# Patient Record
Sex: FEMALE | Race: WHITE | NOT HISPANIC OR LATINO
[De-identification: names, ages, dates, MRNs, and addresses within clinical notes are randomized per-mention and may not be internally consistent; named-entity substitution may affect disease eponyms.]

---

## 2017-02-03 ENCOUNTER — TRANSCRIPTION ENCOUNTER (OUTPATIENT)
Age: 42
End: 2017-02-03

## 2017-02-23 ENCOUNTER — TRANSCRIPTION ENCOUNTER (OUTPATIENT)
Age: 42
End: 2017-02-23

## 2017-02-28 ENCOUNTER — MESSAGE (OUTPATIENT)
Age: 42
End: 2017-02-28

## 2017-02-28 ENCOUNTER — RX RENEWAL (OUTPATIENT)
Age: 42
End: 2017-02-28

## 2017-03-09 RX ORDER — ALPRAZOLAM 0.25 MG/1
0.25 TABLET ORAL
Qty: 30 | Refills: 0 | Status: COMPLETED | COMMUNITY
Start: 2017-02-28 | End: 2017-02-28

## 2017-07-27 ENCOUNTER — RX RENEWAL (OUTPATIENT)
Age: 42
End: 2017-07-27

## 2017-09-06 ENCOUNTER — APPOINTMENT (OUTPATIENT)
Dept: OBGYN | Facility: CLINIC | Age: 42
End: 2017-09-06
Payer: COMMERCIAL

## 2017-09-06 DIAGNOSIS — Z01.419 ENCOUNTER FOR GYNECOLOGICAL EXAMINATION (GENERAL) (ROUTINE) W/OUT ABNORMAL FINDINGS: ICD-10-CM

## 2017-09-06 PROCEDURE — 82270 OCCULT BLOOD FECES: CPT

## 2017-09-06 PROCEDURE — 99396 PREV VISIT EST AGE 40-64: CPT

## 2017-09-06 PROCEDURE — 81003 URINALYSIS AUTO W/O SCOPE: CPT | Mod: QW

## 2017-09-07 ENCOUNTER — RESULT REVIEW (OUTPATIENT)
Age: 42
End: 2017-09-07

## 2017-09-11 ENCOUNTER — RESULT REVIEW (OUTPATIENT)
Age: 42
End: 2017-09-11

## 2017-09-11 LAB
CYTOLOGY CVX/VAG DOC THIN PREP: NORMAL
HPV HIGH+LOW RISK DNA PNL CVX: NEGATIVE

## 2017-12-26 ENCOUNTER — TRANSCRIPTION ENCOUNTER (OUTPATIENT)
Age: 42
End: 2017-12-26

## 2018-09-03 ENCOUNTER — FORM ENCOUNTER (OUTPATIENT)
Age: 43
End: 2018-09-03

## 2018-09-04 ENCOUNTER — APPOINTMENT (OUTPATIENT)
Dept: OBGYN | Facility: CLINIC | Age: 43
End: 2018-09-04
Payer: COMMERCIAL

## 2018-09-04 ENCOUNTER — OUTPATIENT (OUTPATIENT)
Dept: OUTPATIENT SERVICES | Facility: HOSPITAL | Age: 43
LOS: 1 days | End: 2018-09-04
Payer: COMMERCIAL

## 2018-09-04 ENCOUNTER — APPOINTMENT (OUTPATIENT)
Dept: MAMMOGRAPHY | Facility: CLINIC | Age: 43
End: 2018-09-04
Payer: COMMERCIAL

## 2018-09-04 VITALS
BODY MASS INDEX: 30.49 KG/M2 | TEMPERATURE: 98.4 F | WEIGHT: 213 LBS | DIASTOLIC BLOOD PRESSURE: 62 MMHG | HEIGHT: 70 IN | SYSTOLIC BLOOD PRESSURE: 110 MMHG

## 2018-09-04 DIAGNOSIS — Z00.8 ENCOUNTER FOR OTHER GENERAL EXAMINATION: ICD-10-CM

## 2018-09-04 LAB
BILIRUB UR QL STRIP: NORMAL
CLARITY UR: CLEAR
COLLECTION METHOD: NORMAL
GLUCOSE UR-MCNC: NORMAL
HCG UR QL: 0.2 EU/DL
HGB UR QL STRIP.AUTO: NORMAL
KETONES UR-MCNC: NORMAL
LEUKOCYTE ESTERASE UR QL STRIP: NORMAL
NITRITE UR QL STRIP: NORMAL
PH UR STRIP: 5.5
PROT UR STRIP-MCNC: NORMAL
SP GR UR STRIP: >=1.03

## 2018-09-04 PROCEDURE — 77067 SCR MAMMO BI INCL CAD: CPT

## 2018-09-04 PROCEDURE — 77067 SCR MAMMO BI INCL CAD: CPT | Mod: 26

## 2018-09-04 PROCEDURE — 82270 OCCULT BLOOD FECES: CPT

## 2018-09-04 PROCEDURE — 77063 BREAST TOMOSYNTHESIS BI: CPT | Mod: 26

## 2018-09-04 PROCEDURE — 99396 PREV VISIT EST AGE 40-64: CPT

## 2018-09-04 PROCEDURE — 77063 BREAST TOMOSYNTHESIS BI: CPT

## 2018-09-04 RX ORDER — ALPRAZOLAM 0.25 MG/1
0.25 TABLET ORAL
Qty: 20 | Refills: 0 | Status: ACTIVE | COMMUNITY
Start: 2018-09-04 | End: 1900-01-01

## 2018-09-10 LAB
CYTOLOGY CVX/VAG DOC THIN PREP: NORMAL
HPV HIGH+LOW RISK DNA PNL CVX: NOT DETECTED

## 2018-09-28 ENCOUNTER — TRANSCRIPTION ENCOUNTER (OUTPATIENT)
Age: 43
End: 2018-09-28

## 2018-10-10 ENCOUNTER — TRANSCRIPTION ENCOUNTER (OUTPATIENT)
Age: 43
End: 2018-10-10

## 2019-01-30 ENCOUNTER — TRANSCRIPTION ENCOUNTER (OUTPATIENT)
Age: 44
End: 2019-01-30

## 2019-03-20 ENCOUNTER — RX RENEWAL (OUTPATIENT)
Age: 44
End: 2019-03-20

## 2019-12-10 ENCOUNTER — FORM ENCOUNTER (OUTPATIENT)
Age: 44
End: 2019-12-10

## 2019-12-11 ENCOUNTER — APPOINTMENT (OUTPATIENT)
Dept: MAMMOGRAPHY | Facility: CLINIC | Age: 44
End: 2019-12-11
Payer: COMMERCIAL

## 2019-12-11 ENCOUNTER — APPOINTMENT (OUTPATIENT)
Dept: OBGYN | Facility: CLINIC | Age: 44
End: 2019-12-11
Payer: COMMERCIAL

## 2019-12-11 ENCOUNTER — OUTPATIENT (OUTPATIENT)
Dept: OUTPATIENT SERVICES | Facility: HOSPITAL | Age: 44
LOS: 1 days | End: 2019-12-11
Payer: COMMERCIAL

## 2019-12-11 VITALS
HEIGHT: 59 IN | WEIGHT: 200 LBS | DIASTOLIC BLOOD PRESSURE: 70 MMHG | RESPIRATION RATE: 20 BRPM | HEART RATE: 90 BPM | BODY MASS INDEX: 40.32 KG/M2 | SYSTOLIC BLOOD PRESSURE: 118 MMHG

## 2019-12-11 DIAGNOSIS — Z01.419 ENCOUNTER FOR GYNECOLOGICAL EXAMINATION (GENERAL) (ROUTINE) W/OUT ABNORMAL FINDINGS: ICD-10-CM

## 2019-12-11 DIAGNOSIS — Z00.8 ENCOUNTER FOR OTHER GENERAL EXAMINATION: ICD-10-CM

## 2019-12-11 PROCEDURE — 77063 BREAST TOMOSYNTHESIS BI: CPT | Mod: 26

## 2019-12-11 PROCEDURE — 77067 SCR MAMMO BI INCL CAD: CPT | Mod: 26

## 2019-12-11 PROCEDURE — 77067 SCR MAMMO BI INCL CAD: CPT

## 2019-12-11 PROCEDURE — 82270 OCCULT BLOOD FECES: CPT

## 2019-12-11 PROCEDURE — 77063 BREAST TOMOSYNTHESIS BI: CPT

## 2019-12-11 PROCEDURE — 99396 PREV VISIT EST AGE 40-64: CPT

## 2019-12-11 NOTE — PHYSICAL EXAM
[Acute Distress] : no acute distress [Alert] : alert [Awake] : awake [Mass] : no breast mass [Nipple Discharge] : no nipple discharge [Soft] : soft [Axillary LAD] : no axillary lymphadenopathy [Tender] : non tender [Oriented x3] : oriented to person, place, and time [Normal] : cervix [Uterine Adnexae] : were not tender and not enlarged [Pap Obtained] : a Pap smear was performed [No Bleeding] : there was no active vaginal bleeding [Occult Blood] : occult blood test from digital rectal exam was negative [Nl Sphincter Tone] : normal sphincter tone [No Tenderness] : no rectal tenderness

## 2019-12-11 NOTE — COUNSELING
[Breast Self Exam] : breast self exam [Vitamins/Supplements] : vitamins/supplements [Exercise] : exercise [Nutrition] : nutrition [Weight Management] : weight management

## 2019-12-11 NOTE — CHIEF COMPLAINT
[Annual Visit] : annual visit [FreeTextEntry1] : Patient is a 44-year-old female presents for routine annual gynecologic examination. Patient has no complaints. Patient's last mammogram September 2018. Discussed weight loss with the patient in detail

## 2019-12-12 LAB — HPV HIGH+LOW RISK DNA PNL CVX: NOT DETECTED

## 2020-01-02 ENCOUNTER — TRANSCRIPTION ENCOUNTER (OUTPATIENT)
Age: 45
End: 2020-01-02

## 2020-01-06 ENCOUNTER — TRANSCRIPTION ENCOUNTER (OUTPATIENT)
Age: 45
End: 2020-01-06

## 2020-07-29 ENCOUNTER — TRANSCRIPTION ENCOUNTER (OUTPATIENT)
Age: 45
End: 2020-07-29

## 2020-10-19 DIAGNOSIS — Z12.39 ENCOUNTER FOR OTHER SCREENING FOR MALIGNANT NEOPLASM OF BREAST: ICD-10-CM

## 2020-12-16 ENCOUNTER — APPOINTMENT (OUTPATIENT)
Dept: OBGYN | Facility: CLINIC | Age: 45
End: 2020-12-16

## 2020-12-21 ENCOUNTER — APPOINTMENT (OUTPATIENT)
Dept: OBGYN | Facility: CLINIC | Age: 45
End: 2020-12-21
Payer: COMMERCIAL

## 2020-12-21 VITALS
OXYGEN SATURATION: 98 % | HEIGHT: 59 IN | SYSTOLIC BLOOD PRESSURE: 120 MMHG | DIASTOLIC BLOOD PRESSURE: 70 MMHG | HEART RATE: 74 BPM | RESPIRATION RATE: 18 BRPM | WEIGHT: 200 LBS | BODY MASS INDEX: 40.32 KG/M2

## 2020-12-21 DIAGNOSIS — Z01.411 ENCOUNTER FOR GYNECOLOGICAL EXAMINATION (GENERAL) (ROUTINE) WITH ABNORMAL FINDINGS: ICD-10-CM

## 2020-12-21 DIAGNOSIS — N94.6 DYSMENORRHEA, UNSPECIFIED: ICD-10-CM

## 2020-12-21 DIAGNOSIS — N92.0 EXCESSIVE AND FREQUENT MENSTRUATION WITH REGULAR CYCLE: ICD-10-CM

## 2020-12-21 DIAGNOSIS — Z12.4 ENCOUNTER FOR SCREENING FOR MALIGNANT NEOPLASM OF CERVIX: ICD-10-CM

## 2020-12-21 DIAGNOSIS — E66.01 MORBID (SEVERE) OBESITY DUE TO EXCESS CALORIES: ICD-10-CM

## 2020-12-21 PROCEDURE — 99396 PREV VISIT EST AGE 40-64: CPT

## 2020-12-21 PROCEDURE — 99072 ADDL SUPL MATRL&STAF TM PHE: CPT

## 2020-12-21 RX ORDER — VENLAFAXINE HYDROCHLORIDE 75 MG/1
75 CAPSULE, EXTENDED RELEASE ORAL
Qty: 30 | Refills: 0 | Status: DISCONTINUED | COMMUNITY
Start: 2017-08-07 | End: 2020-12-21

## 2020-12-21 RX ORDER — ALPRAZOLAM 0.25 MG/1
0.25 TABLET ORAL
Qty: 15 | Refills: 0 | Status: DISCONTINUED | COMMUNITY
Start: 2017-07-27 | End: 2020-12-21

## 2020-12-21 RX ORDER — ALPRAZOLAM 0.25 MG/1
0.25 TABLET ORAL
Qty: 30 | Refills: 0 | Status: DISCONTINUED | COMMUNITY
Start: 2019-03-20 | End: 2020-12-21

## 2020-12-21 NOTE — PHYSICAL EXAM
[Appropriately responsive] : appropriately responsive [Alert] : alert [No Acute Distress] : no acute distress [No Lymphadenopathy] : no lymphadenopathy [Soft] : soft [Non-tender] : non-tender [Examination Of The Breasts] : a normal appearance [No Discharge] : no discharge [Labia Majora] : normal [Labia Minora] : normal [Normal] : normal [Uterine Adnexae] : normal

## 2020-12-21 NOTE — HISTORY OF PRESENT ILLNESS
[Regular Cycle Intervals] : periods have been regular [Currently Active] : currently active [Men] : men [TextBox_4] : Tiffany is a 46 y/o  who presents today for annual exam.  She has complaints of heavy menses with cramping. Her menses last 5-7 days. she has an Rx for a screening mammogram and an appointment already scheduled [Mammogramdate] : 2019 [ColonoscopyDate] : never [FreeTextEntry3] : BTL

## 2020-12-21 NOTE — DISCUSSION/SUMMARY
[FreeTextEntry1] : 1) pap exam performed\par 2) Pt advised to f/u for pelvic imaging for further evaluation of heavy menses with cramping, especially in light of her elevated BMI. She is at elevated risk for abnormal endometrial thickening.\par \par Return to office in one year, sooner prn\par \par 25 minutes were spent face to face in this visit with greater than 50% of the time spent counseling\par

## 2020-12-23 LAB — HPV HIGH+LOW RISK DNA PNL CVX: NOT DETECTED

## 2020-12-28 ENCOUNTER — RESULT REVIEW (OUTPATIENT)
Age: 45
End: 2020-12-28

## 2020-12-28 ENCOUNTER — APPOINTMENT (OUTPATIENT)
Dept: MAMMOGRAPHY | Facility: CLINIC | Age: 45
End: 2020-12-28
Payer: COMMERCIAL

## 2020-12-28 ENCOUNTER — OUTPATIENT (OUTPATIENT)
Dept: OUTPATIENT SERVICES | Facility: HOSPITAL | Age: 45
LOS: 1 days | End: 2020-12-28
Payer: COMMERCIAL

## 2020-12-28 DIAGNOSIS — N64.89 OTHER SPECIFIED DISORDERS OF BREAST: ICD-10-CM

## 2020-12-28 DIAGNOSIS — Z00.8 ENCOUNTER FOR OTHER GENERAL EXAMINATION: ICD-10-CM

## 2020-12-28 PROCEDURE — 77067 SCR MAMMO BI INCL CAD: CPT

## 2020-12-28 PROCEDURE — 77063 BREAST TOMOSYNTHESIS BI: CPT

## 2020-12-28 PROCEDURE — 77067 SCR MAMMO BI INCL CAD: CPT | Mod: 26

## 2020-12-28 PROCEDURE — 77063 BREAST TOMOSYNTHESIS BI: CPT | Mod: 26

## 2020-12-29 ENCOUNTER — NON-APPOINTMENT (OUTPATIENT)
Age: 45
End: 2020-12-29

## 2020-12-29 ENCOUNTER — TRANSCRIPTION ENCOUNTER (OUTPATIENT)
Age: 45
End: 2020-12-29

## 2020-12-29 PROBLEM — N64.89 BREAST ASYMMETRY: Status: ACTIVE | Noted: 2020-12-29

## 2021-01-27 ENCOUNTER — APPOINTMENT (OUTPATIENT)
Dept: ULTRASOUND IMAGING | Facility: CLINIC | Age: 46
End: 2021-01-27
Payer: COMMERCIAL

## 2021-01-27 ENCOUNTER — RESULT REVIEW (OUTPATIENT)
Age: 46
End: 2021-01-27

## 2021-01-27 ENCOUNTER — APPOINTMENT (OUTPATIENT)
Dept: MAMMOGRAPHY | Facility: CLINIC | Age: 46
End: 2021-01-27
Payer: COMMERCIAL

## 2021-01-27 ENCOUNTER — OUTPATIENT (OUTPATIENT)
Dept: OUTPATIENT SERVICES | Facility: HOSPITAL | Age: 46
LOS: 1 days | End: 2021-01-27
Payer: COMMERCIAL

## 2021-01-27 DIAGNOSIS — Z00.8 ENCOUNTER FOR OTHER GENERAL EXAMINATION: ICD-10-CM

## 2021-01-27 PROCEDURE — 77065 DX MAMMO INCL CAD UNI: CPT

## 2021-01-27 PROCEDURE — 77065 DX MAMMO INCL CAD UNI: CPT | Mod: 26,LT

## 2021-01-27 PROCEDURE — 76641 ULTRASOUND BREAST COMPLETE: CPT | Mod: 26,LT

## 2021-01-27 PROCEDURE — G0279: CPT | Mod: 26

## 2021-01-27 PROCEDURE — G0279: CPT

## 2021-01-27 PROCEDURE — 76641 ULTRASOUND BREAST COMPLETE: CPT

## 2021-03-29 ENCOUNTER — APPOINTMENT (OUTPATIENT)
Dept: OTOLARYNGOLOGY | Facility: CLINIC | Age: 46
End: 2021-03-29
Payer: COMMERCIAL

## 2021-03-29 ENCOUNTER — NON-APPOINTMENT (OUTPATIENT)
Age: 46
End: 2021-03-29

## 2021-03-29 VITALS
SYSTOLIC BLOOD PRESSURE: 135 MMHG | HEART RATE: 81 BPM | HEIGHT: 59 IN | BODY MASS INDEX: 38.3 KG/M2 | WEIGHT: 190 LBS | DIASTOLIC BLOOD PRESSURE: 95 MMHG | TEMPERATURE: 98.2 F

## 2021-03-29 DIAGNOSIS — K21.9 GASTRO-ESOPHAGEAL REFLUX DISEASE W/OUT ESOPHAGITIS: ICD-10-CM

## 2021-03-29 DIAGNOSIS — H68.022 CHRONIC EUSTACHIAN SALPINGITIS, LEFT EAR: ICD-10-CM

## 2021-03-29 DIAGNOSIS — R09.82 POSTNASAL DRIP: ICD-10-CM

## 2021-03-29 DIAGNOSIS — J34.2 DEVIATED NASAL SEPTUM: ICD-10-CM

## 2021-03-29 DIAGNOSIS — J31.0 CHRONIC RHINITIS: ICD-10-CM

## 2021-03-29 DIAGNOSIS — H90.3 SENSORINEURAL HEARING LOSS, BILATERAL: ICD-10-CM

## 2021-03-29 PROCEDURE — 99072 ADDL SUPL MATRL&STAF TM PHE: CPT

## 2021-03-29 PROCEDURE — 99204 OFFICE O/P NEW MOD 45 MIN: CPT | Mod: 25

## 2021-03-29 PROCEDURE — 92557 COMPREHENSIVE HEARING TEST: CPT

## 2021-03-29 PROCEDURE — 31231 NASAL ENDOSCOPY DX: CPT

## 2021-03-29 PROCEDURE — 92567 TYMPANOMETRY: CPT

## 2021-03-29 RX ORDER — PREDNISONE 10 MG/1
10 TABLET ORAL TWICE DAILY
Qty: 15 | Refills: 2 | Status: ACTIVE | COMMUNITY
Start: 2021-03-29 | End: 1900-01-01

## 2021-03-29 RX ORDER — AZELASTINE HYDROCHLORIDE 137 UG/1
137 SPRAY, METERED NASAL
Refills: 0 | Status: ACTIVE | COMMUNITY

## 2021-03-29 RX ORDER — FLUTICASONE PROPIONATE 50 MCG
50 SPRAY, SUSPENSION NASAL
Refills: 0 | Status: ACTIVE | COMMUNITY

## 2021-03-29 RX ORDER — IPRATROPIUM BROMIDE 42 UG/1
0.06 SPRAY NASAL 3 TIMES DAILY
Qty: 1 | Refills: 5 | Status: ACTIVE | COMMUNITY
Start: 2021-03-29 | End: 1900-01-01

## 2021-03-29 NOTE — REVIEW OF SYSTEMS
[Post Nasal Drip] : post nasal drip [Negative] : Heme/Lymph [Patient Intake Form Reviewed] : Patient intake form was reviewed [de-identified] : left ear clogged  / fullness

## 2021-03-29 NOTE — HISTORY OF PRESENT ILLNESS
[de-identified] : co pnd x many months clear and white\par no nasal congestion\par sinusitis 1-2 year\par had allergy eval w rast reported neg\par auto immune\par using flonase and azelastine daily zyrtec not helping\par co throat clearing and cough\par question of reflux\par otc omeprazole now protonix x 1 week w some benefit\par co as crackling and fullness

## 2021-03-29 NOTE — ASSESSMENT
[FreeTextEntry1] : exam ears unremarkable\par audio wnl tymp a/a\par laryngeal signs reflux\par pnd rec change to ipratropium\par continue pantoprazole 40 q d\par reflux diet\par most likely eust tube dys trial pred 10 #15

## 2021-03-29 NOTE — CONSULT LETTER
[Dear  ___] : Dear  [unfilled], [Consult Letter:] : I had the pleasure of evaluating your patient, [unfilled]. [Please see my note below.] : Please see my note below. [Consult Closing:] : Thank you very much for allowing me to participate in the care of this patient.  If you have any questions, please do not hesitate to contact me. [Sincerely,] : Sincerely, [FreeTextEntry1] : Dear   \par \par Thank you for your kind referral. Please refer to my enclosed office notes for [facility] . If there are any questions free to contact me.\par  [FreeTextEntry3] : Renard Rivera MD, FACS\par

## 2021-03-29 NOTE — PROCEDURE
[FreeTextEntry6] : Indication for procedure:  Unable to adequately examine mid and posterior nasal cavity with anterior rhinoscopy\par Sinus endoscope # 47\par Nasal septum exam shows septal deviation to the rt at valve 3 plus and posterior rt dev\par The inferior nasal turbinates are moderately hypertrophic in size bilaterally.\par The sinus endoscope was introduced into the right nares\par exam right middle meatus reveals no mucopus or inflammation.  The right middle turbinate is WNL.\par The scope was advanced and the sphenoethmoid region was inspected.\par The superior meatus, superior turbinate and nasal vault are unremarkable.  The nasopharynx is unremarkable without inflammation or mass.\par The sinus endoscope was introduced into the left nares and\par exam left middle meatus reveals no mucopus or inflammation.  The left middle turbinate is WNL.\par The scope was advanced and the sphenoethmoid region was inspected.\par The left superior meatus and nasal vault are unremarkable.\par The fiberoptic scope was introduced to the posterior pharynx and exam on endolarynx is wnl w good vocal cord mobility. posterior laryngeal edema\par No lesion noted in hypopharynx.\par \par

## 2021-07-14 ENCOUNTER — APPOINTMENT (OUTPATIENT)
Dept: ULTRASOUND IMAGING | Facility: CLINIC | Age: 46
End: 2021-07-14
Payer: COMMERCIAL

## 2021-07-14 ENCOUNTER — APPOINTMENT (OUTPATIENT)
Dept: GASTROENTEROLOGY | Facility: CLINIC | Age: 46
End: 2021-07-14
Payer: COMMERCIAL

## 2021-07-14 ENCOUNTER — OUTPATIENT (OUTPATIENT)
Dept: OUTPATIENT SERVICES | Facility: HOSPITAL | Age: 46
LOS: 1 days | End: 2021-07-14
Payer: COMMERCIAL

## 2021-07-14 VITALS
SYSTOLIC BLOOD PRESSURE: 135 MMHG | WEIGHT: 200 LBS | HEART RATE: 77 BPM | DIASTOLIC BLOOD PRESSURE: 89 MMHG | BODY MASS INDEX: 40.32 KG/M2 | HEIGHT: 59 IN

## 2021-07-14 DIAGNOSIS — R10.11 RIGHT UPPER QUADRANT PAIN: ICD-10-CM

## 2021-07-14 PROCEDURE — 99072 ADDL SUPL MATRL&STAF TM PHE: CPT

## 2021-07-14 PROCEDURE — 76700 US EXAM ABDOM COMPLETE: CPT | Mod: 26

## 2021-07-14 PROCEDURE — 99203 OFFICE O/P NEW LOW 30 MIN: CPT

## 2021-07-14 PROCEDURE — 76700 US EXAM ABDOM COMPLETE: CPT

## 2021-07-15 ENCOUNTER — TRANSCRIPTION ENCOUNTER (OUTPATIENT)
Age: 46
End: 2021-07-15

## 2021-07-15 PROBLEM — R10.11 ABDOMINAL PAIN, RUQ (RIGHT UPPER QUADRANT): Status: ACTIVE | Noted: 2021-07-15

## 2021-07-15 RX ORDER — IPRATROPIUM BROMIDE 42 UG/1
0.06 SPRAY NASAL 3 TIMES DAILY
Qty: 1 | Refills: 5 | Status: ACTIVE | COMMUNITY
Start: 2021-07-15 | End: 1900-01-01

## 2021-07-15 NOTE — ASSESSMENT
[FreeTextEntry1] : 45 year old woman with obesity with reflux,  new onset post prandial epigastric pain. \par \par Sounds like biliary colic, will get RUQ ultrasound today (set it up already). Suggested low fat diet. She is flying to [place] tomorrow so told her if gets fevers/chills or severe pain go to ER. She will call me back after she comes back to get a referral for surgery or ask Dr. Samson when she sees him, or Dr. Green. Suggested to her that she should really have one gastroenterologist (she currently has three).

## 2021-07-15 NOTE — PHYSICAL EXAM
[General Appearance - Alert] : alert [General Appearance - In No Acute Distress] : in no acute distress [Sclera] : the sclera and conjunctiva were normal [PERRL With Normal Accommodation] : pupils were equal in size, round, and reactive to light [Extraocular Movements] : extraocular movements were intact [No CVA Tenderness] : no ~M costovertebral angle tenderness [No Spinal Tenderness] : no spinal tenderness [Abnormal Walk] : normal gait [Nail Clubbing] : no clubbing  or cyanosis of the fingernails [Musculoskeletal - Swelling] : no joint swelling seen [Motor Tone] : muscle strength and tone were normal [Skin Color & Pigmentation] : normal skin color and pigmentation [Skin Turgor] : normal skin turgor [] : no rash [Motor Exam] : the motor exam was normal [No Focal Deficits] : no focal deficits [Oriented To Time, Place, And Person] : oriented to person, place, and time [Impaired Insight] : insight and judgment were intact [Affect] : the affect was normal

## 2021-07-15 NOTE — HISTORY OF PRESENT ILLNESS
[de-identified] : 45 year old woman with obesity with reflux,  new onset post prandial epigastric pain. \par \par Patient states for the past few weeks she gets severe sharp RUP and epigastric pain, immediately post prandially, that can last for a few hours. No nausea or vomiting. the pain is sharp, sometimes radiates around to the back. No fevers or chills. The pain is worse when she has alcohol or fatty foods especially oily food. She is a patient of Dr. Green's and is scheduled for an EGD/Colon with Dr. Samson in two weeks. No weight loss. No overt signs of GI bleeding.

## 2021-08-02 ENCOUNTER — RESULT REVIEW (OUTPATIENT)
Age: 46
End: 2021-08-02

## 2021-08-02 ENCOUNTER — OUTPATIENT (OUTPATIENT)
Dept: OUTPATIENT SERVICES | Facility: HOSPITAL | Age: 46
LOS: 1 days | End: 2021-08-02
Payer: COMMERCIAL

## 2021-08-02 ENCOUNTER — APPOINTMENT (OUTPATIENT)
Dept: MAMMOGRAPHY | Facility: CLINIC | Age: 46
End: 2021-08-02
Payer: COMMERCIAL

## 2021-08-02 DIAGNOSIS — N64.89 OTHER SPECIFIED DISORDERS OF BREAST: ICD-10-CM

## 2021-08-02 PROCEDURE — G0279: CPT | Mod: 26

## 2021-08-02 PROCEDURE — G0279: CPT

## 2021-08-02 PROCEDURE — 77065 DX MAMMO INCL CAD UNI: CPT

## 2021-08-02 PROCEDURE — 77065 DX MAMMO INCL CAD UNI: CPT | Mod: 26,LT

## 2021-08-08 ENCOUNTER — APPOINTMENT (OUTPATIENT)
Dept: DISASTER EMERGENCY | Facility: CLINIC | Age: 46
End: 2021-08-08

## 2021-08-08 DIAGNOSIS — Z01.818 ENCOUNTER FOR OTHER PREPROCEDURAL EXAMINATION: ICD-10-CM

## 2021-08-08 LAB — SARS-COV-2 N GENE NPH QL NAA+PROBE: NOT DETECTED

## 2021-08-10 RX ORDER — SODIUM CHLORIDE 9 MG/ML
1000 INJECTION, SOLUTION INTRAVENOUS
Refills: 0 | Status: DISCONTINUED | OUTPATIENT
Start: 2021-08-11 | End: 2021-08-11

## 2021-08-10 RX ORDER — FENTANYL CITRATE 50 UG/ML
50 INJECTION INTRAVENOUS
Refills: 0 | Status: DISCONTINUED | OUTPATIENT
Start: 2021-08-11 | End: 2021-08-11

## 2021-08-10 RX ORDER — ONDANSETRON 8 MG/1
4 TABLET, FILM COATED ORAL ONCE
Refills: 0 | Status: DISCONTINUED | OUTPATIENT
Start: 2021-08-11 | End: 2021-08-11

## 2021-08-10 RX ORDER — OXYCODONE HYDROCHLORIDE 5 MG/1
10 TABLET ORAL ONCE
Refills: 0 | Status: DISCONTINUED | OUTPATIENT
Start: 2021-08-11 | End: 2021-08-11

## 2021-08-11 ENCOUNTER — OUTPATIENT (OUTPATIENT)
Dept: INPATIENT UNIT | Facility: HOSPITAL | Age: 46
LOS: 1 days | Discharge: HOME | End: 2021-08-11
Payer: COMMERCIAL

## 2021-08-11 ENCOUNTER — RESULT REVIEW (OUTPATIENT)
Age: 46
End: 2021-08-11

## 2021-08-11 VITALS
SYSTOLIC BLOOD PRESSURE: 127 MMHG | HEART RATE: 77 BPM | TEMPERATURE: 98 F | RESPIRATION RATE: 14 BRPM | OXYGEN SATURATION: 99 % | DIASTOLIC BLOOD PRESSURE: 88 MMHG

## 2021-08-11 VITALS
SYSTOLIC BLOOD PRESSURE: 152 MMHG | RESPIRATION RATE: 16 BRPM | WEIGHT: 211.42 LBS | DIASTOLIC BLOOD PRESSURE: 83 MMHG | HEIGHT: 59 IN | TEMPERATURE: 98 F | HEART RATE: 92 BPM | OXYGEN SATURATION: 98 %

## 2021-08-11 DIAGNOSIS — K80.18 CALCULUS OF GALLBLADDER WITH OTHER CHOLECYSTITIS WITHOUT OBSTRUCTION: ICD-10-CM

## 2021-08-11 DIAGNOSIS — Z90.89 ACQUIRED ABSENCE OF OTHER ORGANS: Chronic | ICD-10-CM

## 2021-08-11 DIAGNOSIS — Z98.891 HISTORY OF UTERINE SCAR FROM PREVIOUS SURGERY: Chronic | ICD-10-CM

## 2021-08-11 DIAGNOSIS — K81.0 ACUTE CHOLECYSTITIS: ICD-10-CM

## 2021-08-11 DIAGNOSIS — Z98.890 OTHER SPECIFIED POSTPROCEDURAL STATES: Chronic | ICD-10-CM

## 2021-08-11 LAB
ANION GAP SERPL CALC-SCNC: 4 MMOL/L — LOW (ref 5–17)
BUN SERPL-MCNC: 10 MG/DL — SIGNIFICANT CHANGE UP (ref 7–23)
CALCIUM SERPL-MCNC: 9 MG/DL — SIGNIFICANT CHANGE UP (ref 8.5–10.1)
CHLORIDE SERPL-SCNC: 110 MMOL/L — HIGH (ref 96–108)
CO2 SERPL-SCNC: 25 MMOL/L — SIGNIFICANT CHANGE UP (ref 22–31)
CREAT SERPL-MCNC: 0.63 MG/DL — SIGNIFICANT CHANGE UP (ref 0.5–1.3)
GLUCOSE SERPL-MCNC: 107 MG/DL — HIGH (ref 70–99)
HCG UR QL: NEGATIVE — SIGNIFICANT CHANGE UP
HCT VFR BLD CALC: 37.8 % — SIGNIFICANT CHANGE UP (ref 34.5–45)
HGB BLD-MCNC: 12 G/DL — SIGNIFICANT CHANGE UP (ref 11.5–15.5)
MCHC RBC-ENTMCNC: 26.7 PG — LOW (ref 27–34)
MCHC RBC-ENTMCNC: 31.7 GM/DL — LOW (ref 32–36)
MCV RBC AUTO: 84 FL — SIGNIFICANT CHANGE UP (ref 80–100)
PLATELET # BLD AUTO: 314 K/UL — SIGNIFICANT CHANGE UP (ref 150–400)
POTASSIUM SERPL-MCNC: 3.7 MMOL/L — SIGNIFICANT CHANGE UP (ref 3.5–5.3)
POTASSIUM SERPL-SCNC: 3.7 MMOL/L — SIGNIFICANT CHANGE UP (ref 3.5–5.3)
RBC # BLD: 4.5 M/UL — SIGNIFICANT CHANGE UP (ref 3.8–5.2)
RBC # FLD: 13.3 % — SIGNIFICANT CHANGE UP (ref 10.3–14.5)
SODIUM SERPL-SCNC: 139 MMOL/L — SIGNIFICANT CHANGE UP (ref 135–145)
WBC # BLD: 7.05 K/UL — SIGNIFICANT CHANGE UP (ref 3.8–10.5)
WBC # FLD AUTO: 7.05 K/UL — SIGNIFICANT CHANGE UP (ref 3.8–10.5)

## 2021-08-11 PROCEDURE — 80048 BASIC METABOLIC PNL TOTAL CA: CPT

## 2021-08-11 PROCEDURE — 85027 COMPLETE CBC AUTOMATED: CPT

## 2021-08-11 PROCEDURE — 88304 TISSUE EXAM BY PATHOLOGIST: CPT

## 2021-08-11 PROCEDURE — 81025 URINE PREGNANCY TEST: CPT

## 2021-08-11 PROCEDURE — 93010 ELECTROCARDIOGRAM REPORT: CPT

## 2021-08-11 PROCEDURE — 86900 BLOOD TYPING SEROLOGIC ABO: CPT

## 2021-08-11 PROCEDURE — C9399: CPT

## 2021-08-11 PROCEDURE — 88304 TISSUE EXAM BY PATHOLOGIST: CPT | Mod: 26

## 2021-08-11 PROCEDURE — 86901 BLOOD TYPING SEROLOGIC RH(D): CPT

## 2021-08-11 PROCEDURE — 86850 RBC ANTIBODY SCREEN: CPT

## 2021-08-11 PROCEDURE — 93005 ELECTROCARDIOGRAM TRACING: CPT

## 2021-08-11 PROCEDURE — 36415 COLL VENOUS BLD VENIPUNCTURE: CPT

## 2021-08-11 RX ORDER — ALPRAZOLAM 0.25 MG
1 TABLET ORAL
Qty: 0 | Refills: 0 | DISCHARGE

## 2021-08-11 RX ORDER — LORATADINE 10 MG/1
1 TABLET ORAL
Qty: 0 | Refills: 0 | DISCHARGE

## 2021-08-11 RX ORDER — PANTOPRAZOLE SODIUM 20 MG/1
1 TABLET, DELAYED RELEASE ORAL
Qty: 0 | Refills: 0 | DISCHARGE

## 2021-08-11 RX ORDER — SODIUM CHLORIDE 9 MG/ML
1000 INJECTION, SOLUTION INTRAVENOUS
Refills: 0 | Status: DISCONTINUED | OUTPATIENT
Start: 2021-08-11 | End: 2021-08-11

## 2021-08-11 RX ORDER — OXYCODONE HYDROCHLORIDE 5 MG/1
5 TABLET ORAL ONCE
Refills: 0 | Status: DISCONTINUED | OUTPATIENT
Start: 2021-08-11 | End: 2021-08-11

## 2021-08-11 RX ORDER — OXYCODONE AND ACETAMINOPHEN 5; 325 MG/1; MG/1
1 TABLET ORAL
Qty: 6 | Refills: 0
Start: 2021-08-11 | End: 2021-08-11

## 2021-08-11 RX ORDER — IPRATROPIUM BROMIDE 21 MCG
2 AEROSOL, SPRAY (ML) NASAL
Qty: 0 | Refills: 0 | DISCHARGE

## 2021-08-11 RX ADMIN — OXYCODONE HYDROCHLORIDE 5 MILLIGRAM(S): 5 TABLET ORAL at 11:50

## 2021-08-11 RX ADMIN — FENTANYL CITRATE 50 MICROGRAM(S): 50 INJECTION INTRAVENOUS at 11:05

## 2021-08-11 RX ADMIN — FENTANYL CITRATE 50 MICROGRAM(S): 50 INJECTION INTRAVENOUS at 11:50

## 2021-08-11 RX ADMIN — OXYCODONE HYDROCHLORIDE 5 MILLIGRAM(S): 5 TABLET ORAL at 11:20

## 2021-08-11 RX ADMIN — FENTANYL CITRATE 50 MICROGRAM(S): 50 INJECTION INTRAVENOUS at 11:20

## 2021-08-11 NOTE — ASU PATIENT PROFILE, ADULT - NSICDXPASTMEDICALHX_GEN_ALL_CORE_FT
PAST MEDICAL HISTORY:  Anxiety     Depression     Gallstones     GERD (gastroesophageal reflux disease)     Sinusitis

## 2021-08-11 NOTE — ASU DISCHARGE PLAN (ADULT/PEDIATRIC) - CARE PROVIDER_API CALL
[facility]  [address] [place] [address]  Phone: [phone]  Fax: [phone]  Follow Up Time:

## 2021-08-11 NOTE — ASU DISCHARGE PLAN (ADULT/PEDIATRIC) - CALL YOUR DOCTOR IF YOU HAVE ANY OF THE FOLLOWING:
Bleeding that does not stop/Pain not relieved by Medications/Fever greater than (need to indicate Fahrenheit or Celsius)/Nausea and vomiting that does not stop Bleeding that does not stop/Pain not relieved by Medications/Fever greater than (need to indicate Fahrenheit or Celsius)/Nausea and vomiting that does not stop/Unable to urinate

## 2021-08-17 DIAGNOSIS — K80.10 CALCULUS OF GALLBLADDER WITH CHRONIC CHOLECYSTITIS WITHOUT OBSTRUCTION: ICD-10-CM

## 2021-08-17 DIAGNOSIS — K21.9 GASTRO-ESOPHAGEAL REFLUX DISEASE WITHOUT ESOPHAGITIS: ICD-10-CM

## 2021-08-17 DIAGNOSIS — K82.8 OTHER SPECIFIED DISEASES OF GALLBLADDER: ICD-10-CM

## 2021-08-17 DIAGNOSIS — F41.9 ANXIETY DISORDER, UNSPECIFIED: ICD-10-CM

## 2021-08-17 DIAGNOSIS — F32.9 MAJOR DEPRESSIVE DISORDER, SINGLE EPISODE, UNSPECIFIED: ICD-10-CM

## 2021-09-27 PROBLEM — F41.9 ANXIETY DISORDER, UNSPECIFIED: Chronic | Status: ACTIVE | Noted: 2021-08-11

## 2021-09-27 PROBLEM — J32.9 CHRONIC SINUSITIS, UNSPECIFIED: Chronic | Status: ACTIVE | Noted: 2021-08-11

## 2021-09-27 PROBLEM — K21.9 GASTRO-ESOPHAGEAL REFLUX DISEASE WITHOUT ESOPHAGITIS: Chronic | Status: ACTIVE | Noted: 2021-08-11

## 2021-09-27 PROBLEM — F32.9 MAJOR DEPRESSIVE DISORDER, SINGLE EPISODE, UNSPECIFIED: Chronic | Status: ACTIVE | Noted: 2021-08-11

## 2021-09-27 PROBLEM — K80.20 CALCULUS OF GALLBLADDER WITHOUT CHOLECYSTITIS WITHOUT OBSTRUCTION: Chronic | Status: ACTIVE | Noted: 2021-08-11

## 2021-12-09 ENCOUNTER — TRANSCRIPTION ENCOUNTER (OUTPATIENT)
Age: 46
End: 2021-12-09

## 2021-12-27 ENCOUNTER — APPOINTMENT (OUTPATIENT)
Dept: OBGYN | Facility: CLINIC | Age: 46
End: 2021-12-27
Payer: COMMERCIAL

## 2021-12-27 VITALS
BODY MASS INDEX: 40.32 KG/M2 | SYSTOLIC BLOOD PRESSURE: 106 MMHG | DIASTOLIC BLOOD PRESSURE: 60 MMHG | WEIGHT: 200 LBS | HEIGHT: 59 IN

## 2021-12-27 DIAGNOSIS — Z01.419 ENCOUNTER FOR GYNECOLOGICAL EXAMINATION (GENERAL) (ROUTINE) W/OUT ABNORMAL FINDINGS: ICD-10-CM

## 2021-12-27 DIAGNOSIS — N64.89 OTHER SPECIFIED DISORDERS OF BREAST: ICD-10-CM

## 2021-12-27 PROCEDURE — 99396 PREV VISIT EST AGE 40-64: CPT

## 2021-12-27 PROCEDURE — 82270 OCCULT BLOOD FECES: CPT

## 2021-12-27 RX ORDER — AMOXICILLIN AND CLAVULANATE POTASSIUM 875; 125 MG/1; MG/1
875-125 TABLET, COATED ORAL
Qty: 20 | Refills: 0 | Status: ACTIVE | COMMUNITY
Start: 2021-12-20

## 2021-12-27 RX ORDER — PANTOPRAZOLE 40 MG/1
40 TABLET, DELAYED RELEASE ORAL
Qty: 30 | Refills: 0 | Status: ACTIVE | COMMUNITY
Start: 2021-12-10

## 2021-12-27 RX ORDER — ESCITALOPRAM OXALATE 10 MG/1
10 TABLET ORAL
Qty: 30 | Refills: 0 | Status: ACTIVE | COMMUNITY
Start: 2021-11-22

## 2021-12-27 RX ORDER — OXYCODONE AND ACETAMINOPHEN 5; 325 MG/1; MG/1
5-325 TABLET ORAL
Qty: 6 | Refills: 0 | Status: ACTIVE | COMMUNITY
Start: 2021-08-11

## 2021-12-27 NOTE — HISTORY OF PRESENT ILLNESS
[FreeTextEntry1] : Patient is a 46-year-old female presents for a routine annual gynecologic examination. Patient has no complaints. Patient is scheduled for a 6 month interval bilateral diagnostic mammogram secondary to asymmetric breast tissue in February 2022

## 2021-12-28 LAB — HPV HIGH+LOW RISK DNA PNL CVX: NOT DETECTED

## 2022-03-08 ENCOUNTER — RESULT REVIEW (OUTPATIENT)
Age: 47
End: 2022-03-08

## 2022-03-08 ENCOUNTER — OUTPATIENT (OUTPATIENT)
Dept: OUTPATIENT SERVICES | Facility: HOSPITAL | Age: 47
LOS: 1 days | End: 2022-03-08
Payer: COMMERCIAL

## 2022-03-08 ENCOUNTER — APPOINTMENT (OUTPATIENT)
Dept: MAMMOGRAPHY | Facility: CLINIC | Age: 47
End: 2022-03-08
Payer: COMMERCIAL

## 2022-03-08 DIAGNOSIS — Z98.891 HISTORY OF UTERINE SCAR FROM PREVIOUS SURGERY: Chronic | ICD-10-CM

## 2022-03-08 DIAGNOSIS — Z98.890 OTHER SPECIFIED POSTPROCEDURAL STATES: Chronic | ICD-10-CM

## 2022-03-08 DIAGNOSIS — Z90.89 ACQUIRED ABSENCE OF OTHER ORGANS: Chronic | ICD-10-CM

## 2022-03-08 DIAGNOSIS — N64.89 OTHER SPECIFIED DISORDERS OF BREAST: ICD-10-CM

## 2022-03-08 DIAGNOSIS — Z00.8 ENCOUNTER FOR OTHER GENERAL EXAMINATION: ICD-10-CM

## 2022-03-08 PROCEDURE — G0279: CPT | Mod: 26

## 2022-03-08 PROCEDURE — 77066 DX MAMMO INCL CAD BI: CPT

## 2022-03-08 PROCEDURE — G0279: CPT

## 2022-03-08 PROCEDURE — 77066 DX MAMMO INCL CAD BI: CPT | Mod: 26

## 2022-12-28 ENCOUNTER — APPOINTMENT (OUTPATIENT)
Dept: OBGYN | Facility: CLINIC | Age: 47
End: 2022-12-28
Payer: COMMERCIAL

## 2022-12-28 ENCOUNTER — LABORATORY RESULT (OUTPATIENT)
Age: 47
End: 2022-12-28

## 2022-12-28 VITALS — DIASTOLIC BLOOD PRESSURE: 80 MMHG | SYSTOLIC BLOOD PRESSURE: 118 MMHG | HEIGHT: 59 IN

## 2022-12-28 DIAGNOSIS — Z86.39 PERSONAL HISTORY OF OTHER ENDOCRINE, NUTRITIONAL AND METABOLIC DISEASE: ICD-10-CM

## 2022-12-28 DIAGNOSIS — Z01.419 ENCOUNTER FOR GYNECOLOGICAL EXAMINATION (GENERAL) (ROUTINE) W/OUT ABNORMAL FINDINGS: ICD-10-CM

## 2022-12-28 DIAGNOSIS — E66.9 OBESITY, UNSPECIFIED: ICD-10-CM

## 2022-12-28 PROCEDURE — 82270 OCCULT BLOOD FECES: CPT

## 2022-12-28 PROCEDURE — 99396 PREV VISIT EST AGE 40-64: CPT

## 2022-12-28 RX ORDER — ALPRAZOLAM 0.25 MG/1
0.25 TABLET ORAL
Qty: 20 | Refills: 0 | Status: ACTIVE | COMMUNITY
Start: 2022-12-28 | End: 1900-01-01

## 2022-12-28 NOTE — HISTORY OF PRESENT ILLNESS
[FreeTextEntry1] : ManPatient is a 47-year-old female presents for routine annual gynecologic examination.  Patient has no complaints.  Romel was March 2022.  Patient with anxiety and requests Xanax.  Instructions and precautions reviewed

## 2022-12-30 ENCOUNTER — NON-APPOINTMENT (OUTPATIENT)
Age: 47
End: 2022-12-30

## 2023-01-03 LAB
CYTOLOGY CVX/VAG DOC THIN PREP: ABNORMAL
HPV HIGH+LOW RISK DNA PNL CVX: DETECTED

## 2023-01-04 ENCOUNTER — NON-APPOINTMENT (OUTPATIENT)
Age: 48
End: 2023-01-04

## 2023-01-20 ENCOUNTER — APPOINTMENT (OUTPATIENT)
Dept: OBGYN | Facility: CLINIC | Age: 48
End: 2023-01-20
Payer: COMMERCIAL

## 2023-01-20 ENCOUNTER — LABORATORY RESULT (OUTPATIENT)
Age: 48
End: 2023-01-20

## 2023-01-20 DIAGNOSIS — R87.612 LOW GRADE SQUAMOUS INTRAEPITHELIAL LESION ON CYTOLOGIC SMEAR OF CERVIX (LGSIL): ICD-10-CM

## 2023-01-20 PROCEDURE — 57454 BX/CURETT OF CERVIX W/SCOPE: CPT

## 2023-01-20 PROCEDURE — 99213 OFFICE O/P EST LOW 20 MIN: CPT | Mod: 25

## 2023-01-20 NOTE — PROCEDURE
[Colposcopy] : Colposcopy  [Time out performed] : Pre-procedure time out performed.  Patient's name, date of birth and procedure confirmed. [Consent Obtained] : Consent obtained [Risks] : risks [Benefits] : benefits [Alternatives] : alternatives [Patient] : patient [Infection] : infection [Bleeding] : bleeding [Allergic Reaction] : allergic reaction [LGSIL] : LGSIL [HPV High Risk] : HPV high risk [No Premedication] : no premedication [Colposcopy Adequate] : colposcopy adequate [Pap Performed] : pap not performed [SCI Fully Visualized] : SCI fully visualized [ECC Performed] : ECC performed [No Abnormalities] : no abnormalities [Biopsy] : biopsy taken [Hemostasis Obtained] : Hemostasis obtained [Tolerated Well] : the patient tolerated the procedure well [de-identified] : 2 [de-identified] : No lesions seen [de-identified] : 12:00 cervix, 6:00 cervix [de-identified] : No obvious lesions, further treatment pending pathology results\par \par Instructions and precautions reviewed, follow-up Pap in 6 months

## 2023-01-31 ENCOUNTER — NON-APPOINTMENT (OUTPATIENT)
Age: 48
End: 2023-01-31

## 2023-01-31 ENCOUNTER — TRANSCRIPTION ENCOUNTER (OUTPATIENT)
Age: 48
End: 2023-01-31

## 2023-02-02 ENCOUNTER — RESULT REVIEW (OUTPATIENT)
Age: 48
End: 2023-02-02

## 2023-02-20 ENCOUNTER — APPOINTMENT (OUTPATIENT)
Dept: OBGYN | Facility: CLINIC | Age: 48
End: 2023-02-20
Payer: COMMERCIAL

## 2023-02-20 VITALS
BODY MASS INDEX: 40.32 KG/M2 | HEIGHT: 59 IN | WEIGHT: 200 LBS | DIASTOLIC BLOOD PRESSURE: 80 MMHG | SYSTOLIC BLOOD PRESSURE: 120 MMHG

## 2023-02-20 DIAGNOSIS — N72 INFLAMMATORY DISEASE OF CERVIX UTERI: ICD-10-CM

## 2023-02-20 DIAGNOSIS — B97.7 INFLAMMATORY DISEASE OF CERVIX UTERI: ICD-10-CM

## 2023-02-20 PROCEDURE — 99214 OFFICE O/P EST MOD 30 MIN: CPT

## 2023-02-20 NOTE — HISTORY OF PRESENT ILLNESS
[FreeTextEntry1] : Patient is a 47-year-old female presents for a consultation with her  to discuss test results and treatment options.  Patient had a Pap smear which revealed LGSIL and high risk HPV.  Colposcopic biopsy pathology of the ectocervix was negative endocervical curetting was positive for low-grade dysplasia.  Discussed these findings and results with the patient and her  in detail.  Advised of the options of observation and repeat colposcopy and Pap smear in 6 months versus LEEP cone biopsy of the cervix.  Risk benefits and alternatives have been reviewed.  Questions answered.  Patient desires LEEP procedure.  Instructions and precautions reviewed patient to schedule.\par \par 30 minutes of face time

## 2023-03-06 ENCOUNTER — APPOINTMENT (OUTPATIENT)
Dept: MAMMOGRAPHY | Facility: CLINIC | Age: 48
End: 2023-03-06
Payer: COMMERCIAL

## 2023-03-06 ENCOUNTER — RESULT REVIEW (OUTPATIENT)
Age: 48
End: 2023-03-06

## 2023-03-06 ENCOUNTER — OUTPATIENT (OUTPATIENT)
Dept: OUTPATIENT SERVICES | Facility: HOSPITAL | Age: 48
LOS: 1 days | End: 2023-03-06
Payer: COMMERCIAL

## 2023-03-06 DIAGNOSIS — E66.9 OBESITY, UNSPECIFIED: ICD-10-CM

## 2023-03-06 PROCEDURE — 77066 DX MAMMO INCL CAD BI: CPT

## 2023-03-06 PROCEDURE — G0279: CPT | Mod: 26

## 2023-03-06 PROCEDURE — 77066 DX MAMMO INCL CAD BI: CPT | Mod: 26

## 2023-03-06 PROCEDURE — G0279: CPT

## 2023-03-15 ENCOUNTER — APPOINTMENT (OUTPATIENT)
Dept: OBGYN | Facility: CLINIC | Age: 48
End: 2023-03-15
Payer: COMMERCIAL

## 2023-03-15 ENCOUNTER — LABORATORY RESULT (OUTPATIENT)
Age: 48
End: 2023-03-15

## 2023-03-15 VITALS
HEIGHT: 59 IN | SYSTOLIC BLOOD PRESSURE: 118 MMHG | DIASTOLIC BLOOD PRESSURE: 70 MMHG | BODY MASS INDEX: 40.32 KG/M2 | WEIGHT: 200 LBS

## 2023-03-15 DIAGNOSIS — B97.7 INFLAMMATORY DISEASE OF CERVIX UTERI: ICD-10-CM

## 2023-03-15 DIAGNOSIS — R87.7 ABNORMAL HISTOLOGICAL FINDINGS IN SPECIMENS FROM FEMALE GENITAL ORGANS: ICD-10-CM

## 2023-03-15 DIAGNOSIS — N72 INFLAMMATORY DISEASE OF CERVIX UTERI: ICD-10-CM

## 2023-03-15 LAB
HCG UR QL: NEGATIVE
QUALITY CONTROL: YES

## 2023-03-15 PROCEDURE — 81025 URINE PREGNANCY TEST: CPT

## 2023-03-15 PROCEDURE — 57522 CONIZATION OF CERVIX: CPT

## 2023-03-17 ENCOUNTER — NON-APPOINTMENT (OUTPATIENT)
Age: 48
End: 2023-03-17

## 2023-03-27 ENCOUNTER — APPOINTMENT (OUTPATIENT)
Dept: OBGYN | Facility: CLINIC | Age: 48
End: 2023-03-27
Payer: COMMERCIAL

## 2023-03-27 VITALS
BODY MASS INDEX: 40.32 KG/M2 | DIASTOLIC BLOOD PRESSURE: 76 MMHG | SYSTOLIC BLOOD PRESSURE: 120 MMHG | WEIGHT: 200 LBS | HEIGHT: 59 IN

## 2023-03-27 DIAGNOSIS — R87.7 ABNORMAL HISTOLOGICAL FINDINGS IN SPECIMENS FROM FEMALE GENITAL ORGANS: ICD-10-CM

## 2023-03-27 PROCEDURE — 99213 OFFICE O/P EST LOW 20 MIN: CPT | Mod: 24

## 2023-03-27 NOTE — PHYSICAL EXAM
[Labia Majora] : normal [Labia Minora] : normal [Normal] : normal [Uterine Adnexae] : normal [FreeTextEntry5] : Cervix healing well

## 2023-03-27 NOTE — HISTORY OF PRESENT ILLNESS
[FreeTextEntry1] : Patient is a 47-year-old female that is 2 weeks status post LEEP cone biopsy of the cervix for LGSIL.  Patient has no complaints.  Biopsy pathology was negative no residual dysplasia.  Discussed findings with patient.

## 2023-09-27 ENCOUNTER — APPOINTMENT (OUTPATIENT)
Dept: OBGYN | Facility: CLINIC | Age: 48
End: 2023-09-27
Payer: COMMERCIAL

## 2023-09-27 VITALS
WEIGHT: 200 LBS | SYSTOLIC BLOOD PRESSURE: 100 MMHG | HEIGHT: 59 IN | DIASTOLIC BLOOD PRESSURE: 60 MMHG | BODY MASS INDEX: 40.32 KG/M2

## 2023-09-27 DIAGNOSIS — Z86.59 PERSONAL HISTORY OF OTHER MENTAL AND BEHAVIORAL DISORDERS: ICD-10-CM

## 2023-09-27 DIAGNOSIS — Z87.42 PERSONAL HISTORY OF OTHER DISEASES OF THE FEMALE GENITAL TRACT: ICD-10-CM

## 2023-09-27 DIAGNOSIS — F41.9 ANXIETY DISORDER, UNSPECIFIED: ICD-10-CM

## 2023-09-27 PROCEDURE — 99214 OFFICE O/P EST MOD 30 MIN: CPT

## 2023-09-27 RX ORDER — ALPRAZOLAM 0.25 MG/1
0.25 TABLET ORAL 3 TIMES DAILY
Qty: 30 | Refills: 0 | Status: ACTIVE | COMMUNITY
Start: 2023-09-27 | End: 1900-01-01

## 2023-09-29 LAB
CYTOLOGY CVX/VAG DOC THIN PREP: NORMAL
HPV HIGH+LOW RISK DNA PNL CVX: NOT DETECTED

## 2024-03-27 ENCOUNTER — APPOINTMENT (OUTPATIENT)
Dept: OBGYN | Facility: CLINIC | Age: 49
End: 2024-03-27
Payer: COMMERCIAL

## 2024-03-27 VITALS
WEIGHT: 184 LBS | DIASTOLIC BLOOD PRESSURE: 70 MMHG | HEIGHT: 59 IN | SYSTOLIC BLOOD PRESSURE: 120 MMHG | BODY MASS INDEX: 37.09 KG/M2

## 2024-03-27 DIAGNOSIS — Z01.419 ENCOUNTER FOR GYNECOLOGICAL EXAMINATION (GENERAL) (ROUTINE) W/OUT ABNORMAL FINDINGS: ICD-10-CM

## 2024-03-27 DIAGNOSIS — N95.1 MENOPAUSAL AND FEMALE CLIMACTERIC STATES: ICD-10-CM

## 2024-03-27 LAB
CARD LOT #: NORMAL
CARD LOT EXP DATE: NORMAL
DATE COLLECTED: NORMAL
DEVELOPER LOT #: NORMAL
DEVELOPER LOT EXP DATE: NORMAL
HEMOCCULT SP1 STL QL: NEGATIVE
QUALITY CONTROL: YES

## 2024-03-27 PROCEDURE — 99396 PREV VISIT EST AGE 40-64: CPT

## 2024-03-27 PROCEDURE — 82270 OCCULT BLOOD FECES: CPT

## 2024-03-27 RX ORDER — ALPRAZOLAM 0.25 MG/1
0.25 TABLET ORAL
Qty: 30 | Refills: 0 | Status: ACTIVE | COMMUNITY
Start: 2024-03-27 | End: 1900-01-01

## 2024-03-27 NOTE — HISTORY OF PRESENT ILLNESS
[FreeTextEntry1] : Patient is a 48-year-old female who presents for routine annual gynecologic examination.  Patient has no complaints.  Patient states her cycles are becoming irregular.  Discussed perimenopause with patient.  Patient with a history of HPV and abnormal Pap with a history of a LEEP cone biopsy in the past.  Patient's last mammogram was March 2023.  Patient uses Xanax periodically for anxiety.

## 2024-03-27 NOTE — DISCUSSION/SUMMARY
[FreeTextEntry1] : Impression: Perimenopausal, history of HPV and abnormal Pap for dysplasia.,  Otherwise normal GYN exam  Recommendations: Self breast exam, mammography, calcium and vitamin D supplementation regular exercise  Follow-up in 6 months due to history of cervical dysplasia and HPV

## 2024-03-29 ENCOUNTER — APPOINTMENT (OUTPATIENT)
Dept: MAMMOGRAPHY | Facility: CLINIC | Age: 49
End: 2024-03-29

## 2024-04-01 ENCOUNTER — APPOINTMENT (OUTPATIENT)
Dept: MAMMOGRAPHY | Facility: CLINIC | Age: 49
End: 2024-04-01

## 2024-04-01 LAB — HPV HIGH+LOW RISK DNA PNL CVX: NOT DETECTED

## 2024-04-02 LAB — CYTOLOGY CVX/VAG DOC THIN PREP: ABNORMAL

## 2024-04-19 ENCOUNTER — APPOINTMENT (OUTPATIENT)
Dept: MAMMOGRAPHY | Facility: CLINIC | Age: 49
End: 2024-04-19
Payer: COMMERCIAL

## 2024-04-19 ENCOUNTER — RESULT REVIEW (OUTPATIENT)
Age: 49
End: 2024-04-19

## 2024-04-19 ENCOUNTER — OUTPATIENT (OUTPATIENT)
Dept: OUTPATIENT SERVICES | Facility: HOSPITAL | Age: 49
LOS: 1 days | End: 2024-04-19
Payer: COMMERCIAL

## 2024-04-19 DIAGNOSIS — Z90.89 ACQUIRED ABSENCE OF OTHER ORGANS: Chronic | ICD-10-CM

## 2024-04-19 DIAGNOSIS — N95.1 MENOPAUSAL AND FEMALE CLIMACTERIC STATES: ICD-10-CM

## 2024-04-19 DIAGNOSIS — Z98.891 HISTORY OF UTERINE SCAR FROM PREVIOUS SURGERY: Chronic | ICD-10-CM

## 2024-04-19 DIAGNOSIS — Z98.890 OTHER SPECIFIED POSTPROCEDURAL STATES: Chronic | ICD-10-CM

## 2024-04-19 PROCEDURE — 77067 SCR MAMMO BI INCL CAD: CPT

## 2024-04-19 PROCEDURE — 77067 SCR MAMMO BI INCL CAD: CPT | Mod: 26

## 2024-04-19 PROCEDURE — 77063 BREAST TOMOSYNTHESIS BI: CPT

## 2024-04-19 PROCEDURE — 77063 BREAST TOMOSYNTHESIS BI: CPT | Mod: 26

## 2024-04-27 ENCOUNTER — NON-APPOINTMENT (OUTPATIENT)
Age: 49
End: 2024-04-27

## 2024-04-30 ENCOUNTER — APPOINTMENT (OUTPATIENT)
Dept: ULTRASOUND IMAGING | Facility: CLINIC | Age: 49
End: 2024-04-30
Payer: COMMERCIAL

## 2024-04-30 ENCOUNTER — RESULT REVIEW (OUTPATIENT)
Age: 49
End: 2024-04-30

## 2024-04-30 ENCOUNTER — APPOINTMENT (OUTPATIENT)
Dept: MAMMOGRAPHY | Facility: CLINIC | Age: 49
End: 2024-04-30
Payer: COMMERCIAL

## 2024-04-30 ENCOUNTER — OUTPATIENT (OUTPATIENT)
Dept: OUTPATIENT SERVICES | Facility: HOSPITAL | Age: 49
LOS: 1 days | End: 2024-04-30
Payer: COMMERCIAL

## 2024-04-30 DIAGNOSIS — Z90.89 ACQUIRED ABSENCE OF OTHER ORGANS: Chronic | ICD-10-CM

## 2024-04-30 DIAGNOSIS — Z98.891 HISTORY OF UTERINE SCAR FROM PREVIOUS SURGERY: Chronic | ICD-10-CM

## 2024-04-30 DIAGNOSIS — Z98.890 OTHER SPECIFIED POSTPROCEDURAL STATES: Chronic | ICD-10-CM

## 2024-04-30 DIAGNOSIS — Z00.8 ENCOUNTER FOR OTHER GENERAL EXAMINATION: ICD-10-CM

## 2024-04-30 PROCEDURE — 77065 DX MAMMO INCL CAD UNI: CPT | Mod: 26,RT

## 2024-04-30 PROCEDURE — 76642 ULTRASOUND BREAST LIMITED: CPT | Mod: 26,RT

## 2024-04-30 PROCEDURE — 76642 ULTRASOUND BREAST LIMITED: CPT

## 2024-04-30 PROCEDURE — G0279: CPT | Mod: 26

## 2024-04-30 PROCEDURE — G0279: CPT

## 2024-04-30 PROCEDURE — 77065 DX MAMMO INCL CAD UNI: CPT

## 2024-05-16 DIAGNOSIS — M54.50 LOW BACK PAIN, UNSPECIFIED: ICD-10-CM

## 2024-09-25 ENCOUNTER — APPOINTMENT (OUTPATIENT)
Dept: OBGYN | Facility: CLINIC | Age: 49
End: 2024-09-25
Payer: COMMERCIAL

## 2024-09-25 VITALS
WEIGHT: 184 LBS | HEIGHT: 59 IN | DIASTOLIC BLOOD PRESSURE: 72 MMHG | SYSTOLIC BLOOD PRESSURE: 122 MMHG | BODY MASS INDEX: 37.09 KG/M2

## 2024-09-25 DIAGNOSIS — Z87.410 PERSONAL HISTORY OF CERVICAL DYSPLASIA: ICD-10-CM

## 2024-09-25 DIAGNOSIS — Z86.19 PERSONAL HISTORY OF OTHER INFECTIOUS AND PARASITIC DISEASES: ICD-10-CM

## 2024-09-25 PROCEDURE — 99213 OFFICE O/P EST LOW 20 MIN: CPT

## 2024-09-25 NOTE — HISTORY OF PRESENT ILLNESS
[FreeTextEntry1] : Patient is a 48-year-old female who presents for a 6-month interval surveillance Pap smear.  Patient with a history of cervical dysplasia and high risk HPV.  Patient states she is intermittently skipping periods.  Discussed perimenopause with patient.  Patient is contemplating restarting an SSRI.  Patient advised to discuss this with her PCP.  Discussed natural supplements with patient.

## 2024-09-25 NOTE — DISCUSSION/SUMMARY
[FreeTextEntry1] : Impression: History of cervical dysplasia and high risk HPV, perimenopause  Recommendations: Self breast exam, vitamin supplementation  Follow-up in 6 months

## 2024-09-26 LAB — HPV HIGH+LOW RISK DNA PNL CVX: NOT DETECTED

## 2024-09-27 LAB — CYTOLOGY CVX/VAG DOC THIN PREP: NORMAL

## 2025-01-15 ENCOUNTER — RESULT REVIEW (OUTPATIENT)
Age: 50
End: 2025-01-15

## 2025-01-15 ENCOUNTER — APPOINTMENT (OUTPATIENT)
Dept: RADIOLOGY | Facility: CLINIC | Age: 50
End: 2025-01-15

## 2025-01-15 ENCOUNTER — NON-APPOINTMENT (OUTPATIENT)
Age: 50
End: 2025-01-15

## 2025-01-15 ENCOUNTER — OUTPATIENT (OUTPATIENT)
Dept: OUTPATIENT SERVICES | Facility: HOSPITAL | Age: 50
LOS: 1 days | End: 2025-01-15
Payer: COMMERCIAL

## 2025-01-15 ENCOUNTER — APPOINTMENT (OUTPATIENT)
Dept: GASTROENTEROLOGY | Facility: CLINIC | Age: 50
End: 2025-01-15
Payer: COMMERCIAL

## 2025-01-15 VITALS
BODY MASS INDEX: 37.29 KG/M2 | WEIGHT: 185 LBS | DIASTOLIC BLOOD PRESSURE: 76 MMHG | SYSTOLIC BLOOD PRESSURE: 118 MMHG | HEIGHT: 59 IN

## 2025-01-15 DIAGNOSIS — Z90.89 ACQUIRED ABSENCE OF OTHER ORGANS: Chronic | ICD-10-CM

## 2025-01-15 DIAGNOSIS — R19.4 CHANGE IN BOWEL HABIT: ICD-10-CM

## 2025-01-15 DIAGNOSIS — K21.9 GASTRO-ESOPHAGEAL REFLUX DISEASE W/OUT ESOPHAGITIS: ICD-10-CM

## 2025-01-15 DIAGNOSIS — Z98.891 HISTORY OF UTERINE SCAR FROM PREVIOUS SURGERY: Chronic | ICD-10-CM

## 2025-01-15 DIAGNOSIS — Z12.11 ENCOUNTER FOR SCREENING FOR MALIGNANT NEOPLASM OF COLON: ICD-10-CM

## 2025-01-15 DIAGNOSIS — Z98.890 OTHER SPECIFIED POSTPROCEDURAL STATES: Chronic | ICD-10-CM

## 2025-01-15 PROCEDURE — 74018 RADEX ABDOMEN 1 VIEW: CPT | Mod: 26

## 2025-01-15 PROCEDURE — 74018 RADEX ABDOMEN 1 VIEW: CPT

## 2025-01-15 PROCEDURE — 99204 OFFICE O/P NEW MOD 45 MIN: CPT

## 2025-04-02 ENCOUNTER — APPOINTMENT (OUTPATIENT)
Dept: OBGYN | Facility: CLINIC | Age: 50
End: 2025-04-02
Payer: COMMERCIAL

## 2025-04-02 VITALS
BODY MASS INDEX: 39.92 KG/M2 | WEIGHT: 198 LBS | SYSTOLIC BLOOD PRESSURE: 112 MMHG | DIASTOLIC BLOOD PRESSURE: 68 MMHG | HEIGHT: 59 IN

## 2025-04-02 DIAGNOSIS — N95.1 MENOPAUSAL AND FEMALE CLIMACTERIC STATES: ICD-10-CM

## 2025-04-02 DIAGNOSIS — R45.4 IRRITABILITY AND ANGER: ICD-10-CM

## 2025-04-02 DIAGNOSIS — Z01.419 ENCOUNTER FOR GYNECOLOGICAL EXAMINATION (GENERAL) (ROUTINE) W/OUT ABNORMAL FINDINGS: ICD-10-CM

## 2025-04-02 DIAGNOSIS — R45.86 EMOTIONAL LABILITY: ICD-10-CM

## 2025-04-02 LAB
CARD LOT #: NORMAL
CARD LOT EXP DATE: NORMAL
DATE COLLECTED: NORMAL
DATE COLLECTED: NORMAL
DEVELOPER LOT #: NORMAL
DEVELOPER LOT EXP DATE: NORMAL
HEMOCCULT 2: NEGATIVE
HEMOCCULT SP1 STL QL: NEGATIVE
QUALITY CONTROL: YES
QUALITY CONTROL: YES

## 2025-04-02 PROCEDURE — 99396 PREV VISIT EST AGE 40-64: CPT

## 2025-04-04 LAB — HPV HIGH+LOW RISK DNA PNL CVX: NOT DETECTED

## 2025-04-07 LAB — CYTOLOGY CVX/VAG DOC THIN PREP: NORMAL

## 2025-04-14 ENCOUNTER — RESULT REVIEW (OUTPATIENT)
Age: 50
End: 2025-04-14

## 2025-04-14 ENCOUNTER — APPOINTMENT (OUTPATIENT)
Dept: GASTROENTEROLOGY | Facility: AMBULATORY MEDICAL SERVICES | Age: 50
End: 2025-04-14
Payer: COMMERCIAL

## 2025-04-14 PROCEDURE — 45385 COLONOSCOPY W/LESION REMOVAL: CPT

## 2025-04-14 PROCEDURE — 43239 EGD BIOPSY SINGLE/MULTIPLE: CPT

## 2025-04-18 ENCOUNTER — NON-APPOINTMENT (OUTPATIENT)
Age: 50
End: 2025-04-18

## 2025-05-07 ENCOUNTER — RESULT REVIEW (OUTPATIENT)
Age: 50
End: 2025-05-07

## 2025-05-07 ENCOUNTER — APPOINTMENT (OUTPATIENT)
Dept: MAMMOGRAPHY | Facility: CLINIC | Age: 50
End: 2025-05-07
Payer: COMMERCIAL

## 2025-05-07 ENCOUNTER — OUTPATIENT (OUTPATIENT)
Dept: OUTPATIENT SERVICES | Facility: HOSPITAL | Age: 50
LOS: 1 days | End: 2025-05-07
Payer: COMMERCIAL

## 2025-05-07 DIAGNOSIS — Z90.89 ACQUIRED ABSENCE OF OTHER ORGANS: Chronic | ICD-10-CM

## 2025-05-07 DIAGNOSIS — Z98.891 HISTORY OF UTERINE SCAR FROM PREVIOUS SURGERY: Chronic | ICD-10-CM

## 2025-05-07 DIAGNOSIS — R45.86 EMOTIONAL LABILITY: ICD-10-CM

## 2025-05-07 DIAGNOSIS — Z98.890 OTHER SPECIFIED POSTPROCEDURAL STATES: Chronic | ICD-10-CM

## 2025-05-07 PROCEDURE — 77063 BREAST TOMOSYNTHESIS BI: CPT

## 2025-05-07 PROCEDURE — 77067 SCR MAMMO BI INCL CAD: CPT

## 2025-05-07 PROCEDURE — 77063 BREAST TOMOSYNTHESIS BI: CPT | Mod: 26

## 2025-05-07 PROCEDURE — 77067 SCR MAMMO BI INCL CAD: CPT | Mod: 26
